# Patient Record
(demographics unavailable — no encounter records)

---

## 2024-10-11 NOTE — HISTORY OF PRESENT ILLNESS
[FreeTextEntry1] : remote MI, diabetes, elevated cholesterol.  has been doing very well.  rare episodes of chest pain.  last episode of chest pan long time agol nrml lv function very active physically needs f/u for diabetes.  5/8 no active sx.  denies chest pain, has been very active. blood sugar has been good.  6/26:  pain in both legs for about 2-3 weeks.  was seen in the [facility] and then the [facility] and released,  Had CT of abd and pelvis which was normal and then X-rays at [facility].. all were normal.. told to see her PCP and pt said it was too soon to see her and she was not seen.   has had bruising and swelling of the left groin and thigh and now right side.  does not remember a traumatic event.   10/2/2017  feeling very well. no chest pain.  leg bruising has resolved.  no new medical complaints.  2/5/2018   no chest pain,  last nitro she took was in December.  no leg pain or dyspnea. no medication intolerance.  6/4/2018  feels well.  echo reviewed with pt.. normal LV function.  rare fatigue with mild chest ache but uncommon event,  10/15/2018  returns for scheduled follow up.  no chest pain, dyspnea or edema.  notes lower extremity neuropathy but could not tolerate medication.  2/25/2019  no acute complaints.  maintains on same medications.  has not taken her morning medication yet.  7/1/2019  developed angioedema about a month ago.  mouth, tongue, face..  stopped enalapril about a month ago but still having minor bouts of swelling.  was placed on prednisone, and Allegra.  going back to allergist in about a month for follow up and allergy testing.  no chest pain.    9/9/2019  no new sx.  still off ACE inhibitors.  no further allergic events.  2/3/2020  no chest pain since last visit.  no limiting sx.remans physically active. hasn't taken moring meds yet today,  6/8/2020  no chest pain, hasn't taken medication yet this morning.  hgba1c 8.6%.  echo reviewed and LVEF remains normal and no valve issues.  10/12/2020  rare episodes of chest pain.  took nitro last week with relief.  was bowling at the time.  denies rest pain, dyspnea or edema.  last blood work week ago at [facility].  do no have results.  2/22/2021   no recent chest pain episodes.  received first  Covid vaccine.  no side efffects,   is sick.  stress at home  6/28/2021  last chest pain about a month ago, relieved with nitro. is in a bowling league.  hasn't taken morning  meds yet.    10/04/2021  chest pain overall is better.  last hgba1c 8.9%.  lipids are controlled. no dyspnea,  2/7/2022  no chest pain.  has been bothered with vertigo since Friday  (has had it before, last 2017)  she took meclizine for it before but is out.  got a cortisone shot to her hand and her sugar has been very high since then.  6/13/2022  no chest pain.  needs clearance for colonoscopy.  hgba1c 7.1%.   no other complaints.  12/19/2022  rare chest pain.  remains active.  no medication changes.carotid sono is stable. hgba1c 7.9% tchol 129 HDL 72 tgc 51 LDL 47  6/23/2023  no chest pain. remains very active.  no dyspnea or edema..  no new blood work or imaging.  10/27/2023 lost her  at the end of August.  has been depressed but eating and sleeping ..we discussed grief care and bereavement.  no cardiac sx.. no new medical events.   labs reviewed   tchol 125, ldl42, hdl [id number] 7,3, metab profile is good.  2/2/2024  no new sx.  no chest pain. sob or edema.    6/7/2024  no new sx.. echo reviewed... mild wall motion abnornalites with preserved LVEF... no angina.  hasnt taken morning meds yet,  10/11/2024  no new sx.. no chest pain.  still grieving .  dancing  and goes to exercise. no sob. labs reviewed.. hgba1c 7.4. tchol 121 LDL43 [id number]  had bilat le edema last month when went to [place] and resolved as soon as she returned to [place]. no chest pain, sob.

## 2024-10-11 NOTE — CARDIOLOGY SUMMARY
[de-identified] : Sinus Bradycardia  -Nonspecific ST depression  +  Nonspecific T-abnormality -Nondiagnostic.  ABNORMAL

## 2024-10-11 NOTE — DISCUSSION/SUMMARY
[Coronary Artery Disease] : coronary artery disease [Hyperlipidemia] : hyperlipidemia [Essential Hypertension] : essential hypertension [Stable] : stable [Responding to Treatment] : responding to treatment [None] : There are no changes in medication management [___ Month(s)] : in [unfilled] month(s) [FreeTextEntry1] : hypertension, diabetes, coronary artery disease. no active chest pain,  bp well controlled.  continue current medications. refill medication  no active issues today.  doing well off plavix.   2/5/2018  maintains off plavix.. no chest pain since December.  continue current tx.  echo this year. first since 2015..   6/4/2018  no angina.  normal LV function.  hasn't taken morning meds yet accounts for mildly elevated pressure.  2/25/2019  no angina.  BP elevated but hasn't taken BP meds yet today.   renew medications.  no acute medical complaints.  angina much improved.  7/1/2019  had to stop enalapril due to development of angioedema.  has been taking prednisone and Allegra.. BP is acceptable and would not start any new meds at this time in the middle of allergic reaction and allergy testing.  Will follow up in 2 months and reassess.  2/3/2020  no chest pain/  Bp adequate but didn't take meds yet today. renew meds.  check liipids, follolw up echo,  6/8/2020  no chest pain, dyspnea or edema.  hasn't taken morning meds yet.  reviewed echo, lipids and hgba1c.  maintain current cardiac meds.   stressed compliance with meds  6/23/2023  chest pain free..BP controlled.  having blood work today.  f.u 6 months  10/27/2023  no chest pain.  BP going through bereavement.  labs at goal..   renew meds.  10/11/2024  no cp, sob.  exercising   labs reviewed.. maintain meds,. [EKG obtained to assist in diagnosis and management of assessed problem(s)] : EKG obtained to assist in diagnosis and management of assessed problem(s)

## 2025-02-14 NOTE — DISCUSSION/SUMMARY
[Coronary Artery Disease] : coronary artery disease [Stable] : stable [Hyperlipidemia] : hyperlipidemia [Essential Hypertension] : essential hypertension [Responding to Treatment] : responding to treatment [None] : There are no changes in medication management [___ Month(s)] : in [unfilled] month(s) [FreeTextEntry1] : hypertension, diabetes, coronary artery disease. no active chest pain,  bp well controlled.  continue current medications. refill medication  no active issues today.  doing well off plavix.   2/5/2018  maintains off plavix.. no chest pain since December.  continue current tx.  echo this year. first since 2015..   6/4/2018  no angina.  normal LV function.  hasn't taken morning meds yet accounts for mildly elevated pressure.  2/25/2019  no angina.  BP elevated but hasn't taken BP meds yet today.   renew medications.  no acute medical complaints.  angina much improved.  7/1/2019  had to stop enalapril due to development of angioedema.  has been taking prednisone and Allegra.. BP is acceptable and would not start any new meds at this time in the middle of allergic reaction and allergy testing.  Will follow up in 2 months and reassess.  2/3/2020  no chest pain/  Bp adequate but didn't take meds yet today. renew meds.  check liipids, follolw up echo,  6/8/2020  no chest pain, dyspnea or edema.  hasn't taken morning meds yet.  reviewed echo, lipids and hgba1c.  maintain current cardiac meds.   stressed compliance with meds  6/23/2023  chest pain free..BP controlled.  having blood work today.  f.u 6 months  10/27/2023  no chest pain.  BP going through bereavement.  labs at goal..   renew meds.  2/14/2025  no cp, BP well controlled... no med changes.  no chest pain. [EKG obtained to assist in diagnosis and management of assessed problem(s)] : EKG obtained to assist in diagnosis and management of assessed problem(s)

## 2025-02-14 NOTE — HISTORY OF PRESENT ILLNESS
[FreeTextEntry1] : remote MI, diabetes, elevated cholesterol.  has been doing very well.  rare episodes of chest pain.  last episode of chest pan long time agol nrml lv function very active physically needs f/u for diabetes.  5/8 no active sx.  denies chest pain, has been very active. blood sugar has been good.  6/26:  pain in both legs for about 2-3 weeks.  was seen in the [facility] and then the [facility] and released,  Had CT of abd and pelvis which was normal and then X-rays at [facility].. all were normal.. told to see her PCP and pt said it was too soon to see her and she was not seen.   has had bruising and swelling of the left groin and thigh and now right side.  does not remember a traumatic event.   10/2/2017  feeling very well. no chest pain.  leg bruising has resolved.  no new medical complaints.  2/5/2018   no chest pain,  last nitro she took was in December.  no leg pain or dyspnea. no medication intolerance.  6/4/2018  feels well.  echo reviewed with pt.. normal LV function.  rare fatigue with mild chest ache but uncommon event,  10/15/2018  returns for scheduled follow up.  no chest pain, dyspnea or edema.  notes lower extremity neuropathy but could not tolerate medication.  2/25/2019  no acute complaints.  maintains on same medications.  has not taken her morning medication yet.  7/1/2019  developed angioedema about a month ago.  mouth, tongue, face..  stopped enalapril about a month ago but still having minor bouts of swelling.  was placed on prednisone, and Allegra.  going back to allergist in about a month for follow up and allergy testing.  no chest pain.    9/9/2019  no new sx.  still off ACE inhibitors.  no further allergic events.  2/3/2020  no chest pain since last visit.  no limiting sx.remans physically active. hasn't taken moring meds yet today,  6/8/2020  no chest pain, hasn't taken medication yet this morning.  hgba1c 8.6%.  echo reviewed and LVEF remains normal and no valve issues.  10/12/2020  rare episodes of chest pain.  took nitro last week with relief.  was bowling at the time.  denies rest pain, dyspnea or edema.  last blood work week ago at [facility].  do no have results.  2/22/2021   no recent chest pain episodes.  received first  Covid vaccine.  no side efffects,   is sick.  stress at home  6/28/2021  last chest pain about a month ago, relieved with nitro. is in a bowling league.  hasn't taken morning  meds yet.    10/04/2021  chest pain overall is better.  last hgba1c 8.9%.  lipids are controlled. no dyspnea,  2/7/2022  no chest pain.  has been bothered with vertigo since Friday  (has had it before, last 2017)  she took meclizine for it before but is out.  got a cortisone shot to her hand and her sugar has been very high since then.  6/13/2022  no chest pain.  needs clearance for colonoscopy.  hgba1c 7.1%.   no other complaints.  12/19/2022  rare chest pain.  remains active.  no medication changes.carotid sono is stable. hgba1c 7.9% tchol 129 HDL 72 tgc 51 LDL 47  6/23/2023  no chest pain. remains very active.  no dyspnea or edema..  no new blood work or imaging.  10/27/2023 lost her  at the end of August.  has been depressed but eating and sleeping ..we discussed grief care and bereavement.  no cardiac sx.. no new medical events.   labs reviewed   tchol 125, ldl42, hdl [id number] 7,3, metab profile is good.  2/2/2024  no new sx.  no chest pain. sob or edema.    6/7/2024  no new sx.. echo reviewed... mild wall motion abnornalites with preserved LVEF... no angina.  hasnt taken morning meds yet,  10/11/2024  no new sx.. no chest pain.  still grieving .  dancing  and goes to exercise. no sob. labs reviewed.. hgba1c 7.4. tchol 121 LDL43 [id number]  had bilat le edema last month when went to [place] and resolved as soon as she returned to [place]. no chest pain, sob.  2/14/2025  no cp, sob, edema...

## 2025-06-13 NOTE — DISCUSSION/SUMMARY
[Coronary Artery Disease] : coronary artery disease [Stable] : stable [Hyperlipidemia] : hyperlipidemia [None] : There are no changes in medication management [Essential Hypertension] : essential hypertension [Responding to Treatment] : responding to treatment [___ Month(s)] : in [unfilled] month(s) [FreeTextEntry1] : hypertension, diabetes, coronary artery disease. no active chest pain,  bp well controlled.  continue current medications. refill medication  no active issues today.  doing well off plavix.   2/5/2018  maintains off plavix.. no chest pain since December.  continue current tx.  echo this year. first since 2015..   6/4/2018  no angina.  normal LV function.  hasn't taken morning meds yet accounts for mildly elevated pressure.  2/25/2019  no angina.  BP elevated but hasn't taken BP meds yet today.   renew medications.  no acute medical complaints.  angina much improved.  7/1/2019  had to stop enalapril due to development of angioedema.  has been taking prednisone and Allegra.. BP is acceptable and would not start any new meds at this time in the middle of allergic reaction and allergy testing.  Will follow up in 2 months and reassess.  2/3/2020  no chest pain/  Bp adequate but didn't take meds yet today. renew meds.  check liipids, follolw up echo,  6/8/2020  no chest pain, dyspnea or edema.  hasn't taken morning meds yet.  reviewed echo, lipids and hgba1c.  maintain current cardiac meds.   stressed compliance with meds  6/23/2023  chest pain free..BP controlled.  having blood work today.  f.u 6 months  10/27/2023  no chest pain.  BP going through bereavement.  labs at goal..   renew meds.  2/14/2025  no cp, BP well controlled... no med changes.  no chest pain.  6/13/2025  no angina.  BP and lipids well controled..  [EKG obtained to assist in diagnosis and management of assessed problem(s)] : EKG obtained to assist in diagnosis and management of assessed problem(s)

## 2025-06-13 NOTE — HISTORY OF PRESENT ILLNESS
[FreeTextEntry1] : remote MI, diabetes, elevated cholesterol.  has been doing very well.  rare episodes of chest pain.  last episode of chest pan long time agol nrml lv function very active physically needs f/u for diabetes.  5/8 no active sx.  denies chest pain, has been very active. blood sugar has been good.  6/26:  pain in both legs for about 2-3 weeks.  was seen in the [facility] and then the [facility] and released,  Had CT of abd and pelvis which was normal and then X-rays at [facility].. all were normal.. told to see her PCP and pt said it was too soon to see her and she was not seen.   has had bruising and swelling of the left groin and thigh and now right side.  does not remember a traumatic event.   10/2/2017  feeling very well. no chest pain.  leg bruising has resolved.  no new medical complaints.  2/5/2018   no chest pain,  last nitro she took was in December.  no leg pain or dyspnea. no medication intolerance.  6/4/2018  feels well.  echo reviewed with pt.. normal LV function.  rare fatigue with mild chest ache but uncommon event,  10/15/2018  returns for scheduled follow up.  no chest pain, dyspnea or edema.  notes lower extremity neuropathy but could not tolerate medication.  2/25/2019  no acute complaints.  maintains on same medications.  has not taken her morning medication yet.  7/1/2019  developed angioedema about a month ago.  mouth, tongue, face..  stopped enalapril about a month ago but still having minor bouts of swelling.  was placed on prednisone, and Allegra.  going back to allergist in about a month for follow up and allergy testing.  no chest pain.    9/9/2019  no new sx.  still off ACE inhibitors.  no further allergic events.  2/3/2020  no chest pain since last visit.  no limiting sx.remans physically active. hasn't taken moring meds yet today,  6/8/2020  no chest pain, hasn't taken medication yet this morning.  hgba1c 8.6%.  echo reviewed and LVEF remains normal and no valve issues.  10/12/2020  rare episodes of chest pain.  took nitro last week with relief.  was bowling at the time.  denies rest pain, dyspnea or edema.  last blood work week ago at [email or web address].  do no have results.  2/22/2021   no recent chest pain episodes.  received first  Covid vaccine.  no side efffects,   is sick.  stress at home  6/28/2021  last chest pain about a month ago, relieved with nitro. is in a bowling league.  hasn't taken morning  meds yet.    10/04/2021  chest pain overall is better.  last hgba1c 8.9%.  lipids are controlled. no dyspnea,  2/7/2022  no chest pain.  has been bothered with vertigo since Friday  (has had it before, last 2017)  she took meclizine for it before but is out.  got a cortisone shot to her hand and her sugar has been very high since then.  6/13/2022  no chest pain.  needs clearance for colonoscopy.  hgba1c 7.1%.   no other complaints.  12/19/2022  rare chest pain.  remains active.  no medication changes.carotid sono is stable. hgba1c 7.9% tchol 129 HDL 72 tgc 51 LDL 47  6/23/2023  no chest pain. remains very active.  no dyspnea or edema..  no new blood work or imaging.  10/27/2023 lost her  at the end of August.  has been depressed but eating and sleeping ..we discussed grief care and bereavement.  no cardiac sx.. no new medical events.   labs reviewed   tchol 125, ldl42, hdl [id number] 7,3, metab profile is good.  2/2/2024  no new sx.  no chest pain. sob or edema.    6/7/2024  no new sx.. echo reviewed... mild wall motion abnornalites with preserved LVEF... no angina.  hasnt taken morning meds yet,  10/11/2024  no new sx.. no chest pain.  still grieving .  dancing  and goes to exercise. no sob. labs reviewed.. hgba1c 7.4. tchol 121 LDL43 [id number] 55  had bilat le edema last month when went to [place] and resolved as soon as she returned to [place]. no chest pain, sob.  2/14/2025  no cp, sob, edema...    6/13/2025  no sx.. no cp, sob edema cmp normal hgba1c 7.4 tchol 136 hdl 68tgc 43 ldl 56

## 2025-06-13 NOTE — CARDIOLOGY SUMMARY
[de-identified] : Sinus Bradycardia  Low voltage in precordial leads. -Nonspecific T-abnormality.  ABNORMAL